# Patient Record
Sex: MALE | Race: WHITE
[De-identification: names, ages, dates, MRNs, and addresses within clinical notes are randomized per-mention and may not be internally consistent; named-entity substitution may affect disease eponyms.]

---

## 2017-11-13 ENCOUNTER — HOSPITAL ENCOUNTER (OUTPATIENT)
Dept: HOSPITAL 92 - LABBT | Age: 22
Discharge: HOME | End: 2017-11-13
Attending: ORTHOPAEDIC SURGERY
Payer: COMMERCIAL

## 2017-11-13 DIAGNOSIS — Z01.812: Primary | ICD-10-CM

## 2017-11-13 DIAGNOSIS — S62.142A: ICD-10-CM

## 2017-11-13 LAB
HCT VFR BLD CALC: 44.3 % (ref 42–52)
RBC # BLD AUTO: 4.79 MILL/UL (ref 4–5.2)
WBC # BLD AUTO: 5.2 THOU/UL (ref 4.8–10.8)

## 2017-11-13 PROCEDURE — 85027 COMPLETE CBC AUTOMATED: CPT

## 2017-11-15 ENCOUNTER — HOSPITAL ENCOUNTER (OUTPATIENT)
Dept: HOSPITAL 92 - SDC | Age: 22
Discharge: HOME | End: 2017-11-15
Attending: ORTHOPAEDIC SURGERY
Payer: COMMERCIAL

## 2017-11-15 VITALS — BODY MASS INDEX: 0.1 KG/M2

## 2017-11-15 DIAGNOSIS — G56.21: ICD-10-CM

## 2017-11-15 DIAGNOSIS — S62.152K: Primary | ICD-10-CM

## 2017-11-15 DIAGNOSIS — Z98.890: ICD-10-CM

## 2017-11-15 PROCEDURE — 76001: CPT

## 2017-11-15 PROCEDURE — 96372 THER/PROPH/DIAG INJ SC/IM: CPT

## 2017-11-15 PROCEDURE — S0020 INJECTION, BUPIVICAINE HYDRO: HCPCS

## 2017-11-15 PROCEDURE — 01N40ZZ RELEASE ULNAR NERVE, OPEN APPROACH: ICD-10-PCS | Performed by: ORTHOPAEDIC SURGERY

## 2017-11-15 PROCEDURE — 88311 DECALCIFY TISSUE: CPT

## 2017-11-15 PROCEDURE — 01N50ZZ RELEASE MEDIAN NERVE, OPEN APPROACH: ICD-10-PCS | Performed by: ORTHOPAEDIC SURGERY

## 2017-11-15 PROCEDURE — 0PSN0ZZ REPOSITION LEFT CARPAL, OPEN APPROACH: ICD-10-PCS | Performed by: ORTHOPAEDIC SURGERY

## 2017-11-15 PROCEDURE — 88305 TISSUE EXAM BY PATHOLOGIST: CPT

## 2017-11-15 NOTE — RAD
LEFT HAND INTRAOPERATIVE FLUOROSCOPY

11/15/17

 

HISTORY: 

Pain. Surgery. 

 

FINDINGS/IMPRESSION:  

Intraoperative fluoroscopy is provided for operative fixation as performed by Dr. Holbrook. Multiple 
spot fluoroscopic images show metallic spreaders and operative hardware to apply the wrist. 

 

POS: [facility]

## 2017-11-17 NOTE — OP
DATE OF PROCEDURE:  11/15/2017

 

PREOPERATIVE DIAGNOSES:

1.  Nonunion hamate hook process fracture.

2.  Ulnar nerve neuritis.

3.  Possible median nerve neuritis.

 

FINDINGS:

Definite fracture fragment approximately 5 mm wide and 1 cm long that was loose and displaced and pus
hed against the ulnar nerve.

 

PROCEDURE PERFORMED:

1.  Ulnar nerve neuroplasty.

2.  Median nerve neuroplasty at the wrist.

3.  Excision of nonunion, hamate fracture/hamate hook fracture.

 

HISTORY:  The patient is a , scholarship _____, and could not hold the bat because of 
pain at the ulnar portion.

 

Once we had inflated the tourniquet, achieved timeout, then we made a zigzag incision centered over t
he ring finger at the palmar wrist, and we avoided actual tough pad at the hypothenar and thenar malorie
on.  We carried the incision through the skin and subcutaneous tissue and then, we found the ulnar ne
rve and developed the ulnar nerve from just proximal to _____ canal to the point where it passed the 
hamate hook.  The ulnar nerve was free.  Median nerve was also freed at the same time using neuroplas
ty median nerve at the wrist.  Then, we visualized a small fragment, 1.2, held C-arm portable fluoros
copy which also provides over the area and confirmed that this indeed was the hamate hook.  We then g
ently  from the ulnar nerve remnant, and there was no complication.  Celestone was placed in
 the area where the nerve was pushed by the displaced fragment, the fascia was closed.

 

Tourniquet deflated.  Hemostasis obtained.  There was no arterial bleeding of any type.  We then clos
ed the subcutaneous tissue with 4-0 Monocryl and then the skin was reapproximated with 4-0 nylon inte
rrupted mattress pattern.  Injection was given in the _____ wound area and the patient left the opera
ting room without evidence of anesthetic or operative complication.

## 2018-12-14 ENCOUNTER — HOSPITAL ENCOUNTER (EMERGENCY)
Dept: HOSPITAL 18 - NAV ERS | Age: 23
End: 2018-12-14
Payer: COMMERCIAL
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
## 2018-12-14 DIAGNOSIS — S09.90XA: ICD-10-CM

## 2018-12-14 DIAGNOSIS — I46.9: Primary | ICD-10-CM

## 2018-12-14 PROCEDURE — 99285 EMERGENCY DEPT VISIT HI MDM: CPT

## 2018-12-14 PROCEDURE — G0390 TRAUMA RESPONS W/HOSP CRITI: HCPCS
